# Patient Record
Sex: MALE | Race: WHITE | Employment: FULL TIME | URBAN - METROPOLITAN AREA
[De-identification: names, ages, dates, MRNs, and addresses within clinical notes are randomized per-mention and may not be internally consistent; named-entity substitution may affect disease eponyms.]

---

## 2019-07-21 ENCOUNTER — HOSPITAL ENCOUNTER (OUTPATIENT)
Age: 34
Discharge: HOME | End: 2019-07-21
Attending: FAMILY MEDICINE
Payer: COMMERCIAL

## 2019-07-21 VITALS
OXYGEN SATURATION: 99 % | RESPIRATION RATE: 16 BRPM | BODY MASS INDEX: 30.8 KG/M2 | WEIGHT: 240 LBS | TEMPERATURE: 100 F | HEIGHT: 74 IN | SYSTOLIC BLOOD PRESSURE: 118 MMHG | HEART RATE: 106 BPM | DIASTOLIC BLOOD PRESSURE: 97 MMHG

## 2019-07-21 DIAGNOSIS — J03.90 ACUTE TONSILLITIS, UNSPECIFIED ETIOLOGY: Primary | ICD-10-CM

## 2019-07-21 LAB — S PYO AG THROAT QL: POSITIVE

## 2019-07-21 PROCEDURE — 99204 OFFICE O/P NEW MOD 45 MIN: CPT

## 2019-07-21 PROCEDURE — 87430 STREP A AG IA: CPT

## 2019-07-21 PROCEDURE — 99203 OFFICE O/P NEW LOW 30 MIN: CPT

## 2019-07-21 RX ORDER — PENICILLIN V POTASSIUM 500 MG/1
500 TABLET ORAL 3 TIMES DAILY
Qty: 30 TABLET | Refills: 0 | Status: SHIPPED | OUTPATIENT
Start: 2019-07-21 | End: 2019-07-31

## 2019-07-21 NOTE — ED PROVIDER NOTES
Patient presents with:  Sore Throat: 1 week      HPI:     Tania Boston is a 26year old male who presents with for chief complaint of nasal congestion, sore throat, fatigue. X 3 days.     The patient denies complaints of  neck pain, ear pain, difficul 3. Cardiovascular: Normal rate, regular rhythm and intact distal pulses. Exam reveals no gallop and no friction rub. No murmur heard. 4.RESPIRATORY/Pulmonary/Chest: Effort normal and breath sounds normal. No stridor. No respiratory distress.   no wheez

## 2019-07-21 NOTE — ED INITIAL ASSESSMENT (HPI)
Pt in IC by self c/o sore throat for 1 week. Reports painful swallowing, some cough, nasal congestion, and left-sided throat pain. Reports wife was sick with strep.

## (undated) NOTE — LETTER
Date & Time: 7/21/2019, 11:40 AM  Patient: Екатерина Said  Encounter Provider(s):    Marcus Ly MD       To Whom It May Concern: Espinoza Saravia was seen and treated in our department on 7/21/2019. He can return to work on 7/23/19.     If